# Patient Record
Sex: FEMALE | Race: ASIAN | NOT HISPANIC OR LATINO
[De-identification: names, ages, dates, MRNs, and addresses within clinical notes are randomized per-mention and may not be internally consistent; named-entity substitution may affect disease eponyms.]

---

## 2023-07-31 ENCOUNTER — APPOINTMENT (OUTPATIENT)
Dept: INTERNAL MEDICINE | Facility: CLINIC | Age: 73
End: 2023-07-31
Payer: MEDICAID

## 2023-07-31 ENCOUNTER — OUTPATIENT (OUTPATIENT)
Dept: OUTPATIENT SERVICES | Facility: HOSPITAL | Age: 73
LOS: 1 days | End: 2023-07-31

## 2023-07-31 VITALS
HEART RATE: 80 BPM | WEIGHT: 143 LBS | OXYGEN SATURATION: 97 % | HEIGHT: 62 IN | DIASTOLIC BLOOD PRESSURE: 70 MMHG | SYSTOLIC BLOOD PRESSURE: 120 MMHG | BODY MASS INDEX: 26.31 KG/M2

## 2023-07-31 DIAGNOSIS — Z83.3 FAMILY HISTORY OF DIABETES MELLITUS: ICD-10-CM

## 2023-07-31 DIAGNOSIS — R10.13 EPIGASTRIC PAIN: ICD-10-CM

## 2023-07-31 PROBLEM — Z00.00 ENCOUNTER FOR PREVENTIVE HEALTH EXAMINATION: Status: ACTIVE | Noted: 2023-07-31

## 2023-07-31 PROCEDURE — 99202 OFFICE O/P NEW SF 15 MIN: CPT

## 2023-07-31 RX ORDER — INSULIN GLARGINE 100 [IU]/ML
INJECTION, SOLUTION SUBCUTANEOUS
Refills: 0 | Status: ACTIVE | COMMUNITY

## 2023-07-31 RX ORDER — OMEPRAZOLE 40 MG/1
40 CAPSULE, DELAYED RELEASE ORAL
Refills: 0 | Status: ACTIVE | COMMUNITY

## 2023-07-31 NOTE — HISTORY OF PRESENT ILLNESS
[FreeTextEntry8] : epigastric discomfort   PRIYA STARK is a 73 year old female with DM2 here for a referral to see GI. Has a PCP Dr. Sanz and does not want to switch PCPs. She was advised by her PCP to see a GI for further mgmt. Taking occasional PPI.

## 2023-08-02 DIAGNOSIS — R10.13 EPIGASTRIC PAIN: ICD-10-CM

## 2023-11-02 ENCOUNTER — APPOINTMENT (OUTPATIENT)
Dept: GASTROENTEROLOGY | Facility: CLINIC | Age: 73
End: 2023-11-02